# Patient Record
Sex: FEMALE | Race: WHITE | NOT HISPANIC OR LATINO | Employment: OTHER | URBAN - NONMETROPOLITAN AREA
[De-identification: names, ages, dates, MRNs, and addresses within clinical notes are randomized per-mention and may not be internally consistent; named-entity substitution may affect disease eponyms.]

---

## 2024-09-30 ENCOUNTER — TELEPHONE (OUTPATIENT)
Dept: URGENT CARE | Facility: CLINIC | Age: 79
End: 2024-09-30

## 2024-09-30 ENCOUNTER — APPOINTMENT (OUTPATIENT)
Dept: RADIOLOGY | Facility: CLINIC | Age: 79
End: 2024-09-30
Payer: COMMERCIAL

## 2024-09-30 ENCOUNTER — OFFICE VISIT (OUTPATIENT)
Dept: URGENT CARE | Facility: CLINIC | Age: 79
End: 2024-09-30
Payer: COMMERCIAL

## 2024-09-30 VITALS
HEART RATE: 96 BPM | HEIGHT: 63 IN | WEIGHT: 150 LBS | OXYGEN SATURATION: 98 % | SYSTOLIC BLOOD PRESSURE: 128 MMHG | TEMPERATURE: 97 F | BODY MASS INDEX: 26.58 KG/M2 | DIASTOLIC BLOOD PRESSURE: 80 MMHG | RESPIRATION RATE: 16 BRPM

## 2024-09-30 DIAGNOSIS — S99.922A INJURY OF LEFT FOOT, INITIAL ENCOUNTER: ICD-10-CM

## 2024-09-30 DIAGNOSIS — S93.602A SPRAIN OF LEFT FOOT, INITIAL ENCOUNTER: Primary | ICD-10-CM

## 2024-09-30 PROCEDURE — 73610 X-RAY EXAM OF ANKLE: CPT

## 2024-09-30 PROCEDURE — 99213 OFFICE O/P EST LOW 20 MIN: CPT

## 2024-09-30 PROCEDURE — G0463 HOSPITAL OUTPT CLINIC VISIT: HCPCS

## 2024-09-30 PROCEDURE — 73630 X-RAY EXAM OF FOOT: CPT

## 2024-09-30 RX ORDER — DILTIAZEM HYDROCHLORIDE 120 MG/1
CAPSULE, EXTENDED RELEASE ORAL
COMMUNITY
Start: 2024-09-06

## 2024-09-30 RX ORDER — HYDROCHLOROTHIAZIDE 25 MG/1
25 TABLET ORAL DAILY
COMMUNITY

## 2024-09-30 RX ORDER — GABAPENTIN 100 MG/1
100 CAPSULE ORAL 3 TIMES DAILY
COMMUNITY

## 2024-09-30 RX ORDER — RIVAROXABAN 20 MG/1
TABLET, FILM COATED ORAL
COMMUNITY

## 2024-09-30 NOTE — TELEPHONE ENCOUNTER
Attempted to contact patient regarding final x-ray read: Intra-articular fracture at the lateral base of the first metacarpal. Presume fracture in foot during office visit however trouble distinguishing chronic and acute changes. Has CAM boot and walker/cane at home. Follow up with Podiatry. LVM and awaiting call back to review results.

## 2024-09-30 NOTE — TELEPHONE ENCOUNTER
Patient returned phone call and aware of final XR read. Advised to continue with measures as discussed in clinic and will follow up with Podiatry. Has discs/imaging. No further questions or concerns.

## 2024-09-30 NOTE — PATIENT INSTRUCTIONS
"Preliminary XR read concerning for possible foot fracture, final read pending  CAM boot and walker  Rest, Ice, Compression, and Elevation  OTC Tylenol for pain  Referral placed to Podiatry   Follow up with PCP in 3-5 days.  Proceed to  ER if symptoms worsen.    If tests have been performed at [facility], our office will contact you with results if changes need to be made to the care plan discussed with you at the visit.  You can review your full results on [facility] MyChart.      Patient Education     Foot sprain   The Basics   Written by the doctors and editors at [facility]   What causes a foot sprain? -- A foot sprain happens when you move suddenly, or bend or twist your foot too far in 1 direction. Inside the foot are tough bands of tissue called ligaments, which hold the different bones together. During a sprain, 1 or more of those ligaments stretch too far or even tear (figure 1). This can cause pain and swelling.  What are the symptoms of a foot sprain? -- The symptoms can include pain, tenderness, swelling, and bruising of the foot. Some people with a foot sprain also find it hard to bend the foot or walk. Also, some people cannot put weight on the injured foot.  Is there a test for a foot sprain? -- A doctor or nurse should be able to tell if you have a sprain by doing an exam and learning about what happened to your foot. They might bend and move your foot and ankle to see what hurts.  In some cases, a doctor might order an X-ray to check for broken bones, but that is not always needed. Some doctors might use an ultrasound to look at the ligaments. Ultrasound is an imaging test that creates pictures of the inside of the body.  How is a foot sprain treated? -- Treatment for a sprained foot is easy to remember if you think of the word \"PRICE\":   Protect - To protect your foot, the doctor might recommend a brace, splint, special type of shoe, or walking boot. If so, follow all instructions for using it. An " "elastic bandage can also protect your foot as it heals.   Rest - To rest your foot, you can use crutches and stay off your feet. You might have to limit your activities and how much you walk or stand. This will help your foot rest while it heals.   Ice - Apply a cold gel pack, bag of ice, or bag of frozen vegetables on your foot every 1 to 2 hours, for 15 minutes each time. Put a thin towel between the ice (or other cold object) and your skin. Use the ice (or other cold object) for at least 6 hours after your injury. Some people find it helpful to ice longer, even up to 2 days after their injury.   Compression - \"Compression\" basically means pressure. You want to have your foot under slight pressure by having it wrapped in an elastic bandage. This helps reduce swelling and supports the foot. Your doctor or nurse will show you how to wrap your foot. Be careful not to wrap it too tight, as this could cut off the blood flow to your foot.   Elevation - \"Elevation\" means keeping your foot raised up above the level of your heart. To do this, you can put your leg on some pillows or blankets while you are lying down, or on a table or chair while you are sitting.  You can also take medicines to relieve pain, such as acetaminophen (sample brand name: Tylenol), ibuprofen (sample brand names: Advil, Motrin), or naproxen (sample brand name: Aleve).  Your swelling and pain might start to improve in a few days to weeks, depending on how severe the sprain is. When you have less swelling and pain, you can start to gently stretch your foot. You can also start to do gentle activities again.  What follow-up care do I need? -- Your doctor or nurse will tell you if you need to make a follow-up appointment. If so, make sure that you know when and where to go. Your doctor might recommend working with a physical therapist (exercise expert). If the injury is not healing as expected, your doctor might order an X-ray to look for a broken " bone.  When should I call the doctor? -- Call for advice if:   Your pain or swelling is getting worse.   Your foot or toes are blue or gray, and numb.   You can't put weight on your foot.   Your ankle is not stable or feels wobbly.  All topics are updated as new evidence becomes available and our peer review process is complete.  This topic retrieved from [employer] on: Mar 29, 2024.  Topic 566708 Version 1.0  Release: 32.2.4 - C32.87  © 2024 [employer], Inc. and/or its affiliates. All rights reserved.  figure 1: Ligaments of the foot     This drawing shows some of the ligaments in the foot (in white). Ligaments are tough bands of tissue that hold bones together. When you sprain your foot, 1 or more of the ligaments stretch too far or even tear.  Graphic 624628 Version 1.0  Consumer Information Use and Disclaimer   Disclaimer: This generalized information is a limited summary of diagnosis, treatment, and/or medication information. It is not meant to be comprehensive and should be used as a tool to help the user understand and/or assess potential diagnostic and treatment options. It does NOT include all information about conditions, treatments, medications, side effects, or risks that may apply to a specific patient. It is not intended to be medical advice or a substitute for the medical advice, diagnosis, or treatment of a health care provider based on the health care provider's examination and assessment of a patient's specific and unique circumstances. Patients must speak with a health care provider for complete information about their health, medical questions, and treatment options, including any risks or benefits regarding use of medications. This information does not endorse any treatments or medications as safe, effective, or approved for treating a specific patient. Up[employer], Inc. and its affiliates disclaim any warranty or liability relating to this information or the use thereof.The use of this information is  governed by the Terms of Use, available at https://www.woltersTinteouwer.com/en/know/clinical-effectiveness-terms. 2024© [employer] and its affiliates and/or licensors. All rights reserved.  Copyright   © 2024 [employer] and/or its affiliates. All rights reserved.

## 2024-09-30 NOTE — PROGRESS NOTES
[facility]        NAME: Teetee Vidal is a 78 y.o. female  : 1945    MRN: [id number]  DATE: 2024  TIME: 9:57 AM    Assessment and Plan   Sprain of left foot, initial encounter [S93.602A]  1. Sprain of left foot, initial encounter  XR foot 3+ vw left    XR ankle 3+ vw left    Ambulatory Referral to Podiatry        Preliminary XR read rather challenging to decipher acute osseous abnormalities vs. chronic changes given significant arthritis. Hardware appears intact. AI concerned for possible 1st metatarsal fracture. Will treat with high suspicion for acute osseous abnormality with CAM boot and walker (has both at home). Encouraged continued supportive measures.  RICE therapy. Referral placed to Podiatry, follows with Dr. Leon. Follow up with PCP in 3-5 days or proceed to emergency department for worsening symptoms.  Patient and son verbalized understanding of instructions given.       Patient Instructions     Patient Instructions   Preliminary XR read concerning for possible foot fracture, final read pending  CAM boot and walker  Rest, Ice, Compression, and Elevation  OTC Tylenol for pain  Referral placed to Podiatry   Follow up with PCP in 3-5 days.  Proceed to  ER if symptoms worsen.    If tests have been performed at [facility], our office will contact you with results if changes need to be made to the care plan discussed with you at the visit.  You can review your full results on [facility] MyChart.      Patient Education     Foot sprain   The Basics   Written by the doctors and editors at [employer]   What causes a foot sprain? -- A foot sprain happens when you move suddenly, or bend or twist your foot too far in 1 direction. Inside the foot are tough bands of tissue called ligaments, which hold the different bones together. During a sprain, 1 or more of those ligaments stretch too far or even tear (figure 1). This can cause pain and swelling.  What are the symptoms of a foot sprain?  "-- The symptoms can include pain, tenderness, swelling, and bruising of the foot. Some people with a foot sprain also find it hard to bend the foot or walk. Also, some people cannot put weight on the injured foot.  Is there a test for a foot sprain? -- A doctor or nurse should be able to tell if you have a sprain by doing an exam and learning about what happened to your foot. They might bend and move your foot and ankle to see what hurts.  In some cases, a doctor might order an X-ray to check for broken bones, but that is not always needed. Some doctors might use an ultrasound to look at the ligaments. Ultrasound is an imaging test that creates pictures of the inside of the body.  How is a foot sprain treated? -- Treatment for a sprained foot is easy to remember if you think of the word \"PRICE\":   Protect - To protect your foot, the doctor might recommend a brace, splint, special type of shoe, or walking boot. If so, follow all instructions for using it. An elastic bandage can also protect your foot as it heals.   Rest - To rest your foot, you can use crutches and stay off your feet. You might have to limit your activities and how much you walk or stand. This will help your foot rest while it heals.   Ice - Apply a cold gel pack, bag of ice, or bag of frozen vegetables on your foot every 1 to 2 hours, for 15 minutes each time. Put a thin towel between the ice (or other cold object) and your skin. Use the ice (or other cold object) for at least 6 hours after your injury. Some people find it helpful to ice longer, even up to 2 days after their injury.   Compression - \"Compression\" basically means pressure. You want to have your foot under slight pressure by having it wrapped in an elastic bandage. This helps reduce swelling and supports the foot. Your doctor or nurse will show you how to wrap your foot. Be careful not to wrap it too tight, as this could cut off the blood flow to your foot.   Elevation - \"Elevation\" " means keeping your foot raised up above the level of your heart. To do this, you can put your leg on some pillows or blankets while you are lying down, or on a table or chair while you are sitting.  You can also take medicines to relieve pain, such as acetaminophen (sample brand name: Tylenol), ibuprofen (sample brand names: Advil, Motrin), or naproxen (sample brand name: Aleve).  Your swelling and pain might start to improve in a few days to weeks, depending on how severe the sprain is. When you have less swelling and pain, you can start to gently stretch your foot. You can also start to do gentle activities again.  What follow-up care do I need? -- Your doctor or nurse will tell you if you need to make a follow-up appointment. If so, make sure that you know when and where to go. Your doctor might recommend working with a physical therapist (exercise expert). If the injury is not healing as expected, your doctor might order an X-ray to look for a broken bone.  When should I call the doctor? -- Call for advice if:   Your pain or swelling is getting worse.   Your foot or toes are blue or gray, and numb.   You can't put weight on your foot.   Your ankle is not stable or feels wobbly.  All topics are updated as new evidence becomes available and our peer review process is complete.  This topic retrieved from [facility] on: Mar 29, 2024.  Topic 441829 Version 1.0  Release: 32.2.4 - C32.87  © 2024 UpToDate, Inc. and/or its affiliates. All rights reserved.  figure 1: Ligaments of the foot     This drawing shows some of the ligaments in the foot (in white). Ligaments are tough bands of tissue that hold bones together. When you sprain your foot, 1 or more of the ligaments stretch too far or even tear.  Graphic 716807 Version 1.0  Consumer Information Use and Disclaimer   Disclaimer: This generalized information is a limited summary of diagnosis, treatment, and/or medication information. It is not meant to be comprehensive and  should be used as a tool to help the user understand and/or assess potential diagnostic and treatment options. It does NOT include all information about conditions, treatments, medications, side effects, or risks that may apply to a specific patient. It is not intended to be medical advice or a substitute for the medical advice, diagnosis, or treatment of a health care provider based on the health care provider's examination and assessment of a patient's specific and unique circumstances. Patients must speak with a health care provider for complete information about their health, medical questions, and treatment options, including any risks or benefits regarding use of medications. This information does not endorse any treatments or medications as safe, effective, or approved for treating a specific patient. Up[employer]Date, Inc. and its affiliates disclaim any warranty or liability relating to this information or the use thereof.The use of this information is governed by the Terms of Use, available at https://www.Mini[employer]ke.[employer]/en/know/clinical-effectiveness-terms. 2024© UpToDate, Inc. and its affiliates and/or licensors. All rights reserved.  Copyright   © 2024 Up[employer]Date, Inc. and/or its affiliates. All rights reserved.        Chief Complaint     Chief Complaint   Patient presents with    Ankle Injury     Fell onto ground 5 days ago and injured her left foot and ankle.         History of Present Illness       78-year-old female with a past medical history significant for hypertension and A-fib presents with son for complaints of left foot and ankle injury.  Patient reports mechanical trip and fall in slippers after getting up from recliner about 5 days ago.  Reports mainly pain and swelling to top of left foot with some bruising.  States able to ambulate and bear weight but with some difficulty and using cane.  She states she has been icing and elevating extremity as well.  No numbness, tingling, or weakness. Prior  "history of left ankle fracture with hardware.         Review of Systems   Review of Systems   Constitutional:  Negative for chills and fever.   Respiratory:  Negative for cough and shortness of breath.    Cardiovascular:  Negative for chest pain.   Gastrointestinal:  Negative for abdominal pain, diarrhea, nausea and vomiting.   Musculoskeletal:  Positive for arthralgias, gait problem and joint swelling.   Skin:  Positive for color change.   Neurological:  Negative for weakness and numbness.         Current Medications       Current Outpatient Medications:     diltiazem (CARDIZEM SR) 120 mg 12 hr capsule, TAKE ONE CAPSULE BY MOUTH EVERY TWELVE HOURS FOR 90 DAYS., Disp: , Rfl:     gabapentin (NEURONTIN) 100 mg capsule, Take 100 mg by mouth 3 (three) times a day, Disp: , Rfl:     hydroCHLOROthiazide 25 mg tablet, Take 25 mg by mouth daily, Disp: , Rfl:     Xarelto 20 MG tablet, TAKE ONE TABLET BY MOUTH DAILY FOR 90 DAYS., Disp: , Rfl:     Current Allergies     Allergies as of 09/30/2024 - Reviewed 09/30/2024   Allergen Reaction Noted    Moxifloxacin GI Intolerance 04/22/2012            The following portions of the patient's history were reviewed and updated as appropriate: allergies, current medications, past family history, past medical history, past social history, past surgical history and problem list.     Past Medical History:   Diagnosis Date    Atrial fibrillation (HCC)     Back pain     Hypertension        Past Surgical History:   Procedure Laterality Date    ANKLE FRACTURE SURGERY         History reviewed. No pertinent family history.      Medications have been verified.        Objective   /80   Pulse 96   Temp (!) 97 °F (36.1 °C)   Resp 16   Ht 5' 3\" (1.6 m)   Wt 68 kg (150 lb)   SpO2 98%   BMI 26.57 kg/m²   No LMP recorded. Patient is postmenopausal.       Physical Exam     Physical Exam  Vitals and nursing note reviewed.   Constitutional:       General: She is not in acute distress.     " Appearance: She is not toxic-appearing.   HENT:      Head: Normocephalic.   Eyes:      Conjunctiva/sclera: Conjunctivae normal.   Pulmonary:      Effort: Pulmonary effort is normal.   Musculoskeletal:         General: Swelling and tenderness present.      Left lower leg: Normal.      Left ankle: Swelling present. Tenderness present over the lateral malleolus and medial malleolus. Normal range of motion.      Left foot: Decreased range of motion. Swelling, tenderness and bony tenderness present.      Comments: TTP over dorsal aspect of left foot with associated ecchymosis and swelling that extends to base of digits    Skin:     General: Skin is warm and dry.   Neurological:      Mental Status: She is alert and oriented to person, place, and time.      Sensory: Sensation is intact.      Motor: Motor function is intact.   Psychiatric:         Mood and Affect: Mood normal.         Behavior: Behavior normal.